# Patient Record
(demographics unavailable — no encounter records)

---

## 2024-10-13 NOTE — HISTORY OF PRESENT ILLNESS
[FreeTextEntry1] :  Patient is a 25 year old  presenting for a GYN Sono. Patient has had known right ovarian cyst since 11/2023. LMP 10/06/24. She reports pain in right lower quadrant and back pain associated with occasional nausea. She has been taking ibuprofen and zofran.  Patient feels like pain is interfering with her school work.

## 2024-10-13 NOTE — DISCUSSION/SUMMARY
[FreeTextEntry1] : 24 y/o F with known ovarian cyst presenting for surgical consult -Patient to be booked for laparoscopic right ovarian cystectomy, - task sent for booking. PST to be scheduled -The indication for surgery, risks (bleeding, infection and damage to surrounding organs (bladder, bowel, blood vessels) were reviewed in detail. -The patient was also notified that new cysts may form in the future. Patient verbalized understanding and all questions were answered to apparent patient satisfaction. -Ovarian torsion precautions given -Rx Naprosyn for pain management in the interim 
[FreeTextEntry1] : 26 y/o F with known ovarian cyst presenting for surgical consult -Patient to be booked for laparoscopic right ovarian cystectomy, - task sent for booking. PST to be scheduled -The indication for surgery, risks (bleeding, infection and damage to surrounding organs (bladder, bowel, blood vessels) were reviewed in detail. -The patient was also notified that new cysts may form in the future. Patient verbalized understanding and all questions were answered to apparent patient satisfaction. -Ovarian torsion precautions given -Rx Naprosyn for pain management in the interim 
Attending Only

## 2025-01-24 NOTE — PLAN
[FreeTextEntry1] : 24 y/o F presenting for post operative check -Patient recovering well -Painful periods- Rx sent for Naproxen and recommends chaste berry to regulate periods -Recommends using wash cloths for 2 nights at bath time -f/u PRN

## 2025-01-24 NOTE — HISTORY OF PRESENT ILLNESS
[Pain is well-controlled] : pain is well-controlled [Clean/Dry/Intact] : clean, dry and intact [None] : no vaginal bleeding [Normal] : normal [Pathology reviewed] : pathology reviewed [Fever] : no fever [Chills] : no chills [Nausea] : no nausea [Vomiting] : no vomiting [Pelvic Pressure] : no pelvic pressure [Erythema] : not erythematous [de-identified] : 18 [de-identified] : Patient is a 26 y/o F s/p Right ovarian cystectomy on 01/06/25 for right ovarian cyst. LMP 01/24/25.Patient is feeling well today. Pain well controlled, ambulating, voiding, tolerating PO. Denies subjective fevers and chills.  Pathology Reviewed: Benign [de-identified] : steri strips removed